# Patient Record
Sex: MALE | Race: WHITE | URBAN - METROPOLITAN AREA
[De-identification: names, ages, dates, MRNs, and addresses within clinical notes are randomized per-mention and may not be internally consistent; named-entity substitution may affect disease eponyms.]

---

## 2021-05-30 ENCOUNTER — RECORDS - HEALTHEAST (OUTPATIENT)
Dept: ADMINISTRATIVE | Facility: CLINIC | Age: 61
End: 2021-05-30

## 2025-04-02 ENCOUNTER — TELEPHONE (OUTPATIENT)
Dept: GERIATRICS | Facility: CLINIC | Age: 65
End: 2025-04-02

## 2025-04-02 ENCOUNTER — LAB REQUISITION (OUTPATIENT)
Dept: LAB | Facility: CLINIC | Age: 65
End: 2025-04-02
Payer: COMMERCIAL

## 2025-04-02 ENCOUNTER — TRANSITIONAL CARE UNIT VISIT (OUTPATIENT)
Dept: GERIATRICS | Facility: CLINIC | Age: 65
End: 2025-04-02
Payer: COMMERCIAL

## 2025-04-02 ENCOUNTER — DOCUMENTATION ONLY (OUTPATIENT)
Dept: GERIATRICS | Facility: CLINIC | Age: 65
End: 2025-04-02
Payer: COMMERCIAL

## 2025-04-02 VITALS
SYSTOLIC BLOOD PRESSURE: 130 MMHG | TEMPERATURE: 97.5 F | HEART RATE: 67 BPM | RESPIRATION RATE: 18 BRPM | DIASTOLIC BLOOD PRESSURE: 66 MMHG | BODY MASS INDEX: 36.82 KG/M2 | OXYGEN SATURATION: 96 % | WEIGHT: 271.5 LBS

## 2025-04-02 DIAGNOSIS — F41.9 ANXIETY: ICD-10-CM

## 2025-04-02 DIAGNOSIS — I47.10 SVT (SUPRAVENTRICULAR TACHYCARDIA): ICD-10-CM

## 2025-04-02 DIAGNOSIS — Z95.828 PRESENCE OF IMPLANTED INFUSION PUMP: ICD-10-CM

## 2025-04-02 DIAGNOSIS — E11.9 TYPE 2 DIABETES MELLITUS WITHOUT COMPLICATION, WITHOUT LONG-TERM CURRENT USE OF INSULIN (H): ICD-10-CM

## 2025-04-02 DIAGNOSIS — F33.9 RECURRENT MAJOR DEPRESSIVE DISORDER, REMISSION STATUS UNSPECIFIED: ICD-10-CM

## 2025-04-02 DIAGNOSIS — I10 PRIMARY HYPERTENSION: ICD-10-CM

## 2025-04-02 DIAGNOSIS — M54.50 CHRONIC LOW BACK PAIN, UNSPECIFIED BACK PAIN LATERALITY, UNSPECIFIED WHETHER SCIATICA PRESENT: ICD-10-CM

## 2025-04-02 DIAGNOSIS — G25.81 RESTLESS LEGS SYNDROME: ICD-10-CM

## 2025-04-02 DIAGNOSIS — I25.10 ATHEROSCLEROSIS OF NATIVE CORONARY ARTERY OF NATIVE HEART WITHOUT ANGINA PECTORIS: ICD-10-CM

## 2025-04-02 DIAGNOSIS — E66.812 OBESITY, CLASS II, BMI 35-39.9: Primary | ICD-10-CM

## 2025-04-02 DIAGNOSIS — G89.29 CHRONIC LOW BACK PAIN, UNSPECIFIED BACK PAIN LATERALITY, UNSPECIFIED WHETHER SCIATICA PRESENT: ICD-10-CM

## 2025-04-02 DIAGNOSIS — K59.03 DRUG-INDUCED CONSTIPATION: ICD-10-CM

## 2025-04-02 DIAGNOSIS — M79.7 FIBROMYALGIA: ICD-10-CM

## 2025-04-02 DIAGNOSIS — R33.9 URINARY RETENTION: ICD-10-CM

## 2025-04-02 DIAGNOSIS — F41.9 ANXIETY: Primary | ICD-10-CM

## 2025-04-02 DIAGNOSIS — M62.838 MUSCLE SPASM: ICD-10-CM

## 2025-04-02 PROBLEM — E87.1 HYPONATREMIA: Status: ACTIVE | Noted: 2017-05-25

## 2025-04-02 PROBLEM — R80.9 PROTEINURIA: Status: ACTIVE | Noted: 2017-05-25

## 2025-04-02 PROBLEM — L97.309 DIABETIC ULCER OF ANKLE (H): Status: ACTIVE | Noted: 2024-01-11

## 2025-04-02 PROBLEM — G56.22 CUBITAL TUNNEL SYNDROME ON LEFT: Status: ACTIVE | Noted: 2019-10-03

## 2025-04-02 PROBLEM — F33.1 MAJOR DEPRESSIVE DISORDER, RECURRENT, MODERATE (H): Status: ACTIVE | Noted: 2024-01-11

## 2025-04-02 PROBLEM — I95.9 HYPOTENSION: Status: ACTIVE | Noted: 2025-03-26

## 2025-04-02 PROBLEM — I50.810 RIGHT-SIDED HEART FAILURE, UNSPECIFIED HF CHRONICITY (H): Status: ACTIVE | Noted: 2025-03-06

## 2025-04-02 PROBLEM — E11.622 DIABETIC ULCER OF ANKLE (H): Status: ACTIVE | Noted: 2024-01-11

## 2025-04-02 PROBLEM — E87.5 HYPERKALEMIA: Status: ACTIVE | Noted: 2017-05-25

## 2025-04-02 PROBLEM — E11.8 CONTROLLED TYPE 2 DIABETES MELLITUS WITH COMPLICATION, WITHOUT LONG-TERM CURRENT USE OF INSULIN (H): Status: ACTIVE | Noted: 2022-10-13

## 2025-04-02 PROCEDURE — 99310 SBSQ NF CARE HIGH MDM 45: CPT

## 2025-04-02 RX ORDER — PROCHLORPERAZINE MALEATE 10 MG
10 TABLET ORAL EVERY 6 HOURS PRN
COMMUNITY

## 2025-04-02 RX ORDER — HYDROXYZINE PAMOATE 50 MG/1
50 CAPSULE ORAL EVERY 6 HOURS PRN
COMMUNITY

## 2025-04-02 RX ORDER — KETOCONAZOLE 20 MG/G
CREAM TOPICAL 2 TIMES DAILY
COMMUNITY

## 2025-04-02 RX ORDER — DIAZEPAM 5 MG/1
5 TABLET ORAL DAILY PRN
Qty: 15 TABLET | Refills: 0 | Status: SHIPPED | OUTPATIENT
Start: 2025-04-02

## 2025-04-02 RX ORDER — PANTOPRAZOLE SODIUM 40 MG/1
40 FOR SUSPENSION ORAL DAILY
COMMUNITY

## 2025-04-02 RX ORDER — GABAPENTIN 600 MG/1
600 TABLET ORAL 2 TIMES DAILY
COMMUNITY

## 2025-04-02 RX ORDER — ATORVASTATIN CALCIUM 80 MG/1
80 TABLET, FILM COATED ORAL DAILY
COMMUNITY

## 2025-04-02 RX ORDER — POLYETHYLENE GLYCOL 3350 17 G/17G
17 POWDER, FOR SOLUTION ORAL DAILY
COMMUNITY

## 2025-04-02 RX ORDER — BACLOFEN 10 MG/1
10 TABLET ORAL 3 TIMES DAILY PRN
COMMUNITY

## 2025-04-02 RX ORDER — METOPROLOL SUCCINATE 50 MG/1
50 TABLET, EXTENDED RELEASE ORAL DAILY
COMMUNITY

## 2025-04-02 RX ORDER — TIZANIDINE HYDROCHLORIDE 2 MG/1
2 CAPSULE, GELATIN COATED ORAL EVERY 12 HOURS PRN
COMMUNITY

## 2025-04-02 RX ORDER — BACLOFEN 10 MG/1
10 TABLET ORAL 2 TIMES DAILY
COMMUNITY

## 2025-04-02 RX ORDER — GLIPIZIDE 10 MG/1
10 TABLET ORAL DAILY
COMMUNITY

## 2025-04-02 RX ORDER — ACETAMINOPHEN 325 MG/1
650 TABLET ORAL EVERY 4 HOURS PRN
COMMUNITY

## 2025-04-02 RX ORDER — BISACODYL 10 MG
10 SUPPOSITORY, RECTAL RECTAL DAILY PRN
COMMUNITY

## 2025-04-02 RX ORDER — DICYCLOMINE HCL 20 MG
20 TABLET ORAL 3 TIMES DAILY PRN
COMMUNITY

## 2025-04-02 RX ORDER — TAMSULOSIN HYDROCHLORIDE 0.4 MG/1
0.4 CAPSULE ORAL DAILY
COMMUNITY

## 2025-04-02 RX ORDER — ASPIRIN 81 MG/1
81 TABLET, CHEWABLE ORAL DAILY
COMMUNITY

## 2025-04-02 RX ORDER — FOLIC ACID 0.4 MG
400 TABLET ORAL DAILY
COMMUNITY

## 2025-04-02 RX ORDER — OXYCODONE HYDROCHLORIDE 5 MG/1
10 TABLET ORAL EVERY 4 HOURS PRN
COMMUNITY

## 2025-04-02 RX ORDER — BACLOFEN 20 MG/1
20 TABLET ORAL DAILY
COMMUNITY

## 2025-04-02 RX ORDER — AMOXICILLIN 250 MG
1 CAPSULE ORAL DAILY
COMMUNITY

## 2025-04-02 RX ORDER — LISINOPRIL 40 MG/1
40 TABLET ORAL DAILY
COMMUNITY

## 2025-04-02 NOTE — PROGRESS NOTES
[facility] GERIATRICS    PRIMARY CARE PROVIDER AND CLINIC:  Jose J Angeles MD, [address] / [place] [address]  Chief Complaint   Patient presents with    Hospital F/U      [place] Medical Record Number:  [id number]  Place of Service where encounter took place:  [facility] ([facility]) [[id number]]    Royer Landon  is a 65 year old  (1960), admitted to the above facility from  [facility]. Hospital stay 3/26 through 4/1..     HPI:  Brief Hospital Course Summary:  Presented to the ED with two day sof worsening muscle spasms. Has a history of baclofen/morphine pump catheter occulusion requriing replacement.   On arrival to the ED patient was hypertensive (180/116 mmHg) and tachycardic (126). While in the ER he became hypotensive and no longer fluid responsive.  He met septic shock criteria (BP 86/45 mmHg, Creatinine 2.23, WBC 19.7, lactate 3.1), started on Levophed, given IV antibiotics and admitted for further management.    The patient was admitted to the ICU, monitored on telemetry.  BP dropped after receiving IV Valium 5 mg.  Hypotension resolved.  Infectious work up negative.  ID consulted. WBC count improved from 19.7 to 8.6.  Blood and Urine Cultures negative. COVID-19/RSV/Flu negative.  IV Zosyn stopped (3/26- 3/29).   Septic Shock ruled out; rather hypotensive episode is from pain med.   Pain clinic and PMR consulted.  Pain and muscle spasms not well controlled with current baclofen/Morphine intrathecal pump.  Concern for pump malfunction vs kink.  XR Baclofen Pump and Catheter check custom negative for kinks.  XR Lumbar and Thoracic Spine completed.  Appears Cathter tip is at T6.  Per [employer] Pump Interrogation- intrathecal cathter tip should terminate at T8.   Not sure if T6 is previous cathter tip or current pump/cathter tip migration.   Patient is concerned his cathter tip is occluded.  He states he has had dual Baclofen/Morphine pump for decades.  Most  recently replaced by Dr. Augustine on 7/13/2021.  He gets his pump/cathter exchanged every 5 years.  Initial Baclofen/Morphine pump placed by Dr. Ranjith Wharton in ~ 2007?  Etiology of muscle spasms likely from uncontrolled pain.  [facility]UNAL was consulted to evaluate for seizures.  Patient had a video EEG that was negative for seizures.  [place] [facility] signed off.  Neuro-hospitalist consulted 3/28 and signed off 3/29.  Unlikely patient has MS or some other neurological disorder contributing to worsening muscle spasms.  Neurology recommends he gets his Baclofen/Morphine pump evaluated.  Psychiatry was also consulted to weigh in on patient's worsening muscle spasms and if malingering or uncontrolled anxiety is playing a role.  Dr. Mosquera evaluate patient and felt patient's acute on on chronic pain, muscle spasms are causing worsening anxiety and recommend continuing with Buproprion Xl 300 mg AM and Duloxetine 60 mg AM unchanged.   [facility] and PMR evaluated patient.  Patient does not have signs of opioid withdrawal.  Considering patient's intrathecal pump delivers both Morphine and Baclofen, patient should have signs of opioid withdrawal if his pump was malfunctioning.    [employer] interrogated pump.  Both Medications (Morphine and Baclofen) are being delivered appropriately.  Unsure if there is partial occlusion in cathter tip.  [facility] does not have Privileges at [facility]; but do have privileges at [facility].  Discussed case with Dr. Augustine ([facility] on 3/28, Gege NP on 3/27,  Jesus GONZALEZ from [facility] on 3/27).  Per [facility] records there are no recent changes in medication dose.  Recent February 2025 drain study shows pump in appropriate location.    There was also concern of MS in [facility] note, but patient denies being diagnosed with MS.  Neurology- Dr. Aguirre does not feel patient has MS.   His home oral baclofen was increased to Baclofen 10 mg BID/20mg hs, and 10 mg three times daily PRN. This was  helpful. Robaxin prn added for muscle spasm as well.  Oxycodone prn helpful for pain control. Will allow for daily prn valium for anxiety/muscle spasm though should minimize use.  While on telemetry, patient's heart rate elevated up to 150s- 180s  Cardiology consult requested.  Monitor on telemetry. Cardiology notes that pt had an episode of SVT in the setting of holding his verapamil. Discussion was held about restarting verapamil but pt/wife note that verapamil is very expensive so decision made to switch to metoprolol. Now is on metoprolol 25mg bid. Plan for Zio patch for 2 weeks at discharge. Follow up with cardiology in 4 weeks to review Zio patch.  Urology consulted for urinary retention, placed a mac.  Suspect patient has BPH and some element of neurogenic bladder given multiple spine surgeries.  Initial spine surgery was in 1982. Mac catheter placed 3/28; keep fo 5-7 days and then consider TOV.  Pt much weaker compared to prior to admission. TCU recommended and found. He will complete his therapies at TCU and then will f/u with [facility] for intrathecal catheter dye study. (Had attempt to schedule it for during his TCU stay but cost is too high so will have to have it done after TCU stay).     Today Jasmeet reports that he is really tired. Feels like he has not had a good night of sleep for several days due to being in the hospital. States that at baseline he walks with a walker or uses his wheelchair to get around. He had two episodes of muscle spasms last night. Along with these, he felt his heart  racing. Note, he is wearing a holter monitor. States he is constipated. At home does not have bowel movements frequently, it is normal for him to go ~ 2 weeks without a bowel movement. Mac catheter is in place. Eating and drinking. Denies nausea/vomiting. Denies shortness of breath, chest pain, palpitations, dizziness, lightheadedness.  Confirmed today, pt is full code.     CODE STATUS/ADVANCE DIRECTIVES  DISCUSSION:  Full Code  CPR/Full code   ALLERGIES:   Allergies   Allergen Reactions    Adhesive Tape       PAST MEDICAL HISTORY:   Past Medical History:   Diagnosis Date    Back pain     Diabetes (H)     Hypertension     Sleep apnea       PAST SURGICAL HISTORY:   has a past surgical history that includes appendectomy; ENT surgery; and back surgery.  FAMILY HISTORY: family history is not on file.  SOCIAL HISTORY:   reports that he has quit smoking. He does not have any smokeless tobacco history on file.  Patient's living condition: lives with spouse    Current Outpatient Medications   Medication Sig Dispense Refill    acetaminophen (TYLENOL) 325 MG tablet Take 650 mg by mouth every 4 hours as needed for mild pain.      aspirin (ASA) 81 MG chewable tablet Take 81 mg by mouth daily.      atorvastatin (LIPITOR) 80 MG tablet Take 80 mg by mouth daily.      baclofen (LIORESAL) 10 MG tablet Take 10 mg by mouth 3 times daily as needed for muscle spasms.      baclofen (LIORESAL) 10 MG tablet Take 10 mg by mouth 2 times daily.      baclofen (LIORESAL) 20 MG tablet Take 20 mg by mouth daily.      bisacodyl (DULCOLAX) 10 MG suppository Place 10 mg rectally daily as needed for constipation.      BUPROPION HCL ER, XL, PO Take 300 mg by mouth daily      diazepam (VALIUM) 5 MG tablet Take 1 tablet (5 mg) by mouth daily as needed for anxiety. 15 tablet 0    dicyclomine (BENTYL) 20 MG tablet Take 20 mg by mouth 3 times daily as needed.      DULoxetine HCl (CYMBALTA PO) Take 60 mg by mouth daily 2 tabs in AM      folic acid (FOLVITE) 400 MCG tablet Take 400 mcg by mouth daily.      gabapentin (NEURONTIN) 600 MG tablet Take 600 mg by mouth 2 times daily.      glipiZIDE (GLUCOTROL) 10 MG tablet Take 10 mg by mouth daily.      hydrOXYzine (VISTARIL) 50 MG capsule Take 50 mg by mouth every 6 hours as needed for itching.      ketoconazole (NIZORAL) 2 % external cream Apply topically 2 times daily.      lisinopril (ZESTRIL) 40 MG tablet  Take 40 mg by mouth daily.      melatonin 3 MG tablet Take 3 mg by mouth nightly as needed for sleep.      metFORMIN (GLUCOPHAGE) 1000 MG tablet Take 1,000 mg by mouth 2 times daily (with meals).      metoprolol succinate ER (TOPROL XL) 50 MG 24 hr tablet Take 50 mg by mouth daily.      naloxone (NARCAN) 1 mg/mL SUSP Take 1 mg by mouth every 5 minutes as needed for constipation.      oxyCODONE (ROXICODONE) 5 MG tablet Take 10 mg by mouth every 4 hours as needed for severe pain.      pantoprazole sodium (PROTONIX) 40 MG packet Take 40 mg by mouth daily.      polyethylene glycol (MIRALAX) 17 g packet Take 17 g by mouth daily.      Probiotic Product (EULOGIO-BID PROBIOTIC PO) Take 250 mg by mouth daily.      prochlorperazine (COMPAZINE) 10 MG tablet Take 10 mg by mouth every 6 hours as needed for nausea or vomiting.      senna-docusate (SENOKOT-S/PERICOLACE) 8.6-50 MG tablet Take 1 tablet by mouth daily.      tamsulosin (FLOMAX) 0.4 MG capsule Take 0.4 mg by mouth daily.      tiZANidine (ZANAFLEX) 2 MG capsule Take 2 mg by mouth every 12 hours as needed.       No current facility-administered medications for this visit.        ROS:  4 point ROS including Respiratory, CV, GI and , other than that noted in the HPI,  is negative    Vital signs:/66   Pulse 67   Temp 97.5  F (36.4  C)   Resp 18   Wt 123.2 kg (271 lb 8 oz)   SpO2 96%   BMI 36.82 kg/m     GENERAL APPEARANCE: Well developed, well nourished, in no acute distress.  LUNGS: Lung sounds CTA, no adventitious sounds, respiratory effort normal.  CARD: RRR, S1, S2, without murmurs, gallops, rubs, no JVD, peripheral pulses 2+ and symmetric  ABD: Soft, nondistended and nontender with normal bowel sounds.   MSK: Muscle strength and tone were equal bilaterally. Moves all extremities easily and intentionally.   EXTREMITIES: No cyanosis, clubbing or edema.  NEURO: Alert and oriented x 3. Lethargic  PSYCH: pleasant     Lab/Diagnostic data:  Recent labs in [facility]  reviewed by me today.  This includes magnesium, hgb, BMP, CBC    ASSESSMENT/PLAN:  Intrathecal pain pump  Muscle spasms  Chronic low back pain  Anxiety  Fibromyalgia syndrome  Depression  Obesity  BMI 35-39.9  Initially was hypotensive upon admission to ED, thought to be related to pain meds  Negative EEG, xr catheter study negative  [facility] to follow up with patient upon TCU discharge for evaluation and dye study  Spasms currently managed with PRN tylenol, PRN baclofen, scheduled baclofen, PRN oxycodone and PRN tizanidine  Anxiety thought to be contributing to spasms- continue diazepam PRN, duloxetine, PRN hydroxyzine and bupropion  PT/OT  Weight today 271.5 lbs     Constipation  Discussed importance of at the minimum, EOD Bms  Senna s 2 tabs BID  Miralax daily  PRN bisocody suppository if no BM in next 24 hours    SVT  HTN  Coronary artery atherosclerosis  Metoprolol start in the hosptial (PTA verapamil Dc'd)  Hrs 60s  Bps 130s/60s  Continue PTA lisinopril and asa 81   Continue atorvastatin  Currently completing zio patch- follow up with cardiology up discharge from TCU    Type II DM  Most recent A1c 8.3%  Continue PTA glipizide  BG checks once daily at 7:30 am    Urinary retention  De La Torre catheter is in from hospitalization  PVR trial on 4/4/25  Continue tamsulosin  Follow up with outpt urology    Orders:  Magnesium, CBC, BMP due 4/7/25    >45 minutes spent assessing patient, providing education, reviewing records from recent hospitalization at [facility], collaborating with nursing, developing plan of care.     Electronically signed by:  JONAS Walls CNP

## 2025-04-02 NOTE — LETTER
4/2/2025      Royer Landon  [address]  [place] [address]        [facility] GERIATRICS    PRIMARY CARE PROVIDER AND CLINIC:  Jose J Angeles MD, [address] / [place] [address]  Chief Complaint   Patient presents with     Hospital F/U      [place] Medical Record Number:  [id number]  Place of Service where encounter took place:  [facility] ([facility]) [[id number]]    Royer Landon  is a 65 year old  (1960), admitted to the above facility from  [facility]. Hospital stay 3/26 through 4/1..     HPI:  Brief Hospital Course Summary:  Presented to the ED with two day sof worsening muscle spasms. Has a history of baclofen/morphine pump catheter occulusion requriing replacement.   On arrival to the ED patient was hypertensive (180/116 mmHg) and tachycardic (126). While in the ER he became hypotensive and no longer fluid responsive.  He met septic shock criteria (BP 86/45 mmHg, Creatinine 2.23, WBC 19.7, lactate 3.1), started on Levophed, given IV antibiotics and admitted for further management.    The patient was admitted to the ICU, monitored on telemetry.  BP dropped after receiving IV Valium 5 mg.  Hypotension resolved.  Infectious work up negative.  ID consulted. WBC count improved from 19.7 to 8.6.  Blood and Urine Cultures negative. COVID-19/RSV/Flu negative.  IV Zosyn stopped (3/26- 3/29).   Septic Shock ruled out; rather hypotensive episode is from pain med.   Pain clinic and PMR consulted.  Pain and muscle spasms not well controlled with current baclofen/Morphine intrathecal pump.  Concern for pump malfunction vs kink.  XR Baclofen Pump and Catheter check custom negative for kinks.  XR Lumbar and Thoracic Spine completed.  Appears Cathter tip is at T6.  Per [employer] Pump Interrogation- intrathecal cathter tip should terminate at T8.   Not sure if T6 is previous cathter tip or current pump/cathter tip migration.   Patient is concerned his cathter  tip is occluded.  He states he has had dual Baclofen/Morphine pump for decades.  Most recently replaced by Dr. Augustine on 7/13/2021.  He gets his pump/cathter exchanged every 5 years.  Initial Baclofen/Morphine pump placed by Dr. aRnjith Wharton in ~ 2007?  Etiology of muscle spasms likely from uncontrolled pain.  [facility] was consulted to evaluate for seizures.  Patient had a video EEG that was negative for seizures.  [place] [facility] signed off.  Neuro-hospitalist consulted 3/28 and signed off 3/29.  Unlikely patient has MS or some other neurological disorder contributing to worsening muscle spasms.  Neurology recommends he gets his Baclofen/Morphine pump evaluated.  Psychiatry was also consulted to weigh in on patient's worsening muscle spasms and if malingering or uncontrolled anxiety is playing a role.  Dr. Mosquera evaluate patient and felt patient's acute on on chronic pain, muscle spasms are causing worsening anxiety and recommend continuing with Buproprion Xl 300 mg AM and Duloxetine 60 mg AM unchanged.   [facility] and PMR evaluated patient.  Patient does not have signs of opioid withdrawal.  Considering patient's intrathecal pump delivers both Morphine and Baclofen, patient should have signs of opioid withdrawal if his pump was malfunctioning.    [employer] interrogated pump.  Both Medications (Morphine and Baclofen) are being delivered appropriately.  Unsure if there is partial occlusion in cathter tip.  [facility] does not have Privileges at [facility]; but do have privileges at [facility].  Discussed case with Dr. Augustine ([facility] on 3/28, Gege NP on 3/27,  Jesus GONZALEZ from [facility] on 3/27).  Per [facility] records there are no recent changes in medication dose.  Recent February 2025 drain study shows pump in appropriate location.    There was also concern of MS in [facility] note, but patient denies being diagnosed with MS.  Neurology- Dr. Aguirre does not feel patient has MS.   His home oral baclofen was  increased to Baclofen 10 mg BID/20mg hs, and 10 mg three times daily PRN. This was helpful. Robaxin prn added for muscle spasm as well.  Oxycodone prn helpful for pain control. Will allow for daily prn valium for anxiety/muscle spasm though should minimize use.  While on telemetry, patient's heart rate elevated up to 150s- 180s  Cardiology consult requested.  Monitor on telemetry. Cardiology notes that pt had an episode of SVT in the setting of holding his verapamil. Discussion was held about restarting verapamil but pt/wife note that verapamil is very expensive so decision made to switch to metoprolol. Now is on metoprolol 25mg bid. Plan for Zio patch for 2 weeks at discharge. Follow up with cardiology in 4 weeks to review Zio patch.  Urology consulted for urinary retention, placed a mac.  Suspect patient has BPH and some element of neurogenic bladder given multiple spine surgeries.  Initial spine surgery was in 1982. Mac catheter placed 3/28; keep fo 5-7 days and then consider TOV.  Pt much weaker compared to prior to admission. TCU recommended and found. He will complete his therapies at TCU and then will f/u with [facility] for intrathecal catheter dye study. (Had attempt to schedule it for during his TCU stay but cost is too high so will have to have it done after TCU stay).     Today Jasmeet reports that he is really tired. Feels like he has not had a good night of sleep for several days due to being in the hospital. States that at baseline he walks with a walker or uses his wheelchair to get around. He had two episodes of muscle spasms last night. Along with these, he felt his heart  racing. Note, he is wearing a holter monitor. States he is constipated. At home does not have bowel movements frequently, it is normal for him to go ~ 2 weeks without a bowel movement. Mac catheter is in place. Eating and drinking. Denies nausea/vomiting. Denies shortness of breath, chest pain, palpitations, dizziness,  lightheadedness.  Confirmed today, pt is full code.     CODE STATUS/ADVANCE DIRECTIVES DISCUSSION:  Full Code  CPR/Full code   ALLERGIES:   Allergies   Allergen Reactions     Adhesive Tape       PAST MEDICAL HISTORY:   Past Medical History:   Diagnosis Date     Back pain      Diabetes (H)      Hypertension      Sleep apnea       PAST SURGICAL HISTORY:   has a past surgical history that includes appendectomy; ENT surgery; and back surgery.  FAMILY HISTORY: family history is not on file.  SOCIAL HISTORY:   reports that he has quit smoking. He does not have any smokeless tobacco history on file.  Patient's living condition: lives with spouse    Current Outpatient Medications   Medication Sig Dispense Refill     acetaminophen (TYLENOL) 325 MG tablet Take 650 mg by mouth every 4 hours as needed for mild pain.       aspirin (ASA) 81 MG chewable tablet Take 81 mg by mouth daily.       atorvastatin (LIPITOR) 80 MG tablet Take 80 mg by mouth daily.       baclofen (LIORESAL) 10 MG tablet Take 10 mg by mouth 3 times daily as needed for muscle spasms.       baclofen (LIORESAL) 10 MG tablet Take 10 mg by mouth 2 times daily.       baclofen (LIORESAL) 20 MG tablet Take 20 mg by mouth daily.       bisacodyl (DULCOLAX) 10 MG suppository Place 10 mg rectally daily as needed for constipation.       BUPROPION HCL ER, XL, PO Take 300 mg by mouth daily       diazepam (VALIUM) 5 MG tablet Take 1 tablet (5 mg) by mouth daily as needed for anxiety. 15 tablet 0     dicyclomine (BENTYL) 20 MG tablet Take 20 mg by mouth 3 times daily as needed.       DULoxetine HCl (CYMBALTA PO) Take 60 mg by mouth daily 2 tabs in AM       folic acid (FOLVITE) 400 MCG tablet Take 400 mcg by mouth daily.       gabapentin (NEURONTIN) 600 MG tablet Take 600 mg by mouth 2 times daily.       glipiZIDE (GLUCOTROL) 10 MG tablet Take 10 mg by mouth daily.       hydrOXYzine (VISTARIL) 50 MG capsule Take 50 mg by mouth every 6 hours as needed for itching.        ketoconazole (NIZORAL) 2 % external cream Apply topically 2 times daily.       lisinopril (ZESTRIL) 40 MG tablet Take 40 mg by mouth daily.       melatonin 3 MG tablet Take 3 mg by mouth nightly as needed for sleep.       metFORMIN (GLUCOPHAGE) 1000 MG tablet Take 1,000 mg by mouth 2 times daily (with meals).       metoprolol succinate ER (TOPROL XL) 50 MG 24 hr tablet Take 50 mg by mouth daily.       naloxone (NARCAN) 1 mg/mL SUSP Take 1 mg by mouth every 5 minutes as needed for constipation.       oxyCODONE (ROXICODONE) 5 MG tablet Take 10 mg by mouth every 4 hours as needed for severe pain.       pantoprazole sodium (PROTONIX) 40 MG packet Take 40 mg by mouth daily.       polyethylene glycol (MIRALAX) 17 g packet Take 17 g by mouth daily.       Probiotic Product (EULOGIO-BID PROBIOTIC PO) Take 250 mg by mouth daily.       prochlorperazine (COMPAZINE) 10 MG tablet Take 10 mg by mouth every 6 hours as needed for nausea or vomiting.       senna-docusate (SENOKOT-S/PERICOLACE) 8.6-50 MG tablet Take 1 tablet by mouth daily.       tamsulosin (FLOMAX) 0.4 MG capsule Take 0.4 mg by mouth daily.       tiZANidine (ZANAFLEX) 2 MG capsule Take 2 mg by mouth every 12 hours as needed.       No current facility-administered medications for this visit.        ROS:  4 point ROS including Respiratory, CV, GI and , other than that noted in the HPI,  is negative    Vital signs:/66   Pulse 67   Temp 97.5  F (36.4  C)   Resp 18   Wt 123.2 kg (271 lb 8 oz)   SpO2 96%   BMI 36.82 kg/m     GENERAL APPEARANCE: Well developed, well nourished, in no acute distress.  LUNGS: Lung sounds CTA, no adventitious sounds, respiratory effort normal.  CARD: RRR, S1, S2, without murmurs, gallops, rubs, no JVD, peripheral pulses 2+ and symmetric  ABD: Soft, nondistended and nontender with normal bowel sounds.   MSK: Muscle strength and tone were equal bilaterally. Moves all extremities easily and intentionally.   EXTREMITIES: No cyanosis,  clubbing or edema.  NEURO: Alert and oriented x 3. Lethargic  PSYCH: pleasant     Lab/Diagnostic data:  Recent labs in [facility] reviewed by me today.  This includes magnesium, hgb, BMP, CBC    ASSESSMENT/PLAN:  Intrathecal pain pump  Muscle spasms  Chronic low back pain  Anxiety  Fibromyalgia syndrome  Depression  Obesity  BMI 35-39.9  Initially was hypotensive upon admission to ED, thought to be related to pain meds  Negative EEG, xr catheter study negative  [facility] to follow up with patient upon TCU discharge for evaluation and dye study  Spasms currently managed with PRN tylenol, PRN baclofen, scheduled baclofen, PRN oxycodone and PRN tizanidine  Anxiety thought to be contributing to spasms- continue diazepam PRN, duloxetine, PRN hydroxyzine and bupropion  PT/OT  Weight today 271.5 lbs     Constipation  Discussed importance of at the minimum, EOD Bms  Senna s 2 tabs BID  Miralax daily  PRN bisocody suppository if no BM in next 24 hours    SVT  HTN  Coronary artery atherosclerosis  Metoprolol start in the hosptial (PTA verapamil Dc'd)  Hrs 60s  Bps 130s/60s  Continue PTA lisinopril and asa 81   Continue atorvastatin  Currently completing zio patch- follow up with cardiology up discharge from TCU    Type II DM  Most recent A1c 8.3%  Continue PTA glipizide  BG checks once daily at 7:30 am    Urinary retention  De La Torre catheter is in from hospitalization  PVR trial on 4/4/25  Continue tamsulosin  Follow up with outpt urology    Orders:  Magnesium, CBC, BMP due 4/7/25    >45 minutes spent assessing patient, providing education, reviewing records from recent hospitalization at [facility], collaborating with nursing, developing plan of care.     Electronically signed by:  JONAS Walls CNP                    Sincerely,        JONAS Walls CNP    Electronically signed

## 2025-04-03 ENCOUNTER — TELEPHONE (OUTPATIENT)
Dept: GERIATRICS | Facility: CLINIC | Age: 65
End: 2025-04-03
Payer: COMMERCIAL

## 2025-04-03 NOTE — TELEPHONE ENCOUNTER
[facility]   2025     Name: Royer Landon   : 1960     Background:  Patient complains of extreme pain from his catheter with urination. Requests to go to ED.    Orders:  Ok to send to ED per patient request.    Electronically signed by JONAS Blas CNP

## 2025-04-04 NOTE — TELEPHONE ENCOUNTER
Facilty called at 8:55 PM.  Pt having pain in his bladder/catheter/penis and wants to go to the ED.  He was in the ED on 4/2/25.  Wife doesn't want to send him to the ED because they didn't do anything on 4/2/25.     Has prn medications. Will try some pain medications and baclofen prn.  To help stop pain        Orders  1) Urojet 2% lidocaine gel  2) if not better change mac and obtain a UA/UC.       JONAS Shin CNP on 4/3/2025 at 9:07 PM

## 2025-04-07 ENCOUNTER — DOCUMENTATION ONLY (OUTPATIENT)
Dept: GERIATRICS | Facility: CLINIC | Age: 65
End: 2025-04-07

## 2025-04-07 ENCOUNTER — TRANSITIONAL CARE UNIT VISIT (OUTPATIENT)
Dept: GERIATRICS | Facility: CLINIC | Age: 65
End: 2025-04-07
Payer: COMMERCIAL

## 2025-04-07 VITALS
OXYGEN SATURATION: 95 % | TEMPERATURE: 97 F | BODY MASS INDEX: 38.01 KG/M2 | DIASTOLIC BLOOD PRESSURE: 77 MMHG | HEIGHT: 71 IN | RESPIRATION RATE: 16 BRPM | SYSTOLIC BLOOD PRESSURE: 134 MMHG | HEART RATE: 59 BPM | WEIGHT: 271.5 LBS

## 2025-04-07 DIAGNOSIS — M79.7 FIBROMYALGIA: ICD-10-CM

## 2025-04-07 DIAGNOSIS — I47.10 SVT (SUPRAVENTRICULAR TACHYCARDIA): ICD-10-CM

## 2025-04-07 DIAGNOSIS — M62.838 MUSCLE SPASM: ICD-10-CM

## 2025-04-07 DIAGNOSIS — I10 PRIMARY HYPERTENSION: ICD-10-CM

## 2025-04-07 DIAGNOSIS — F41.9 ANXIETY: ICD-10-CM

## 2025-04-07 DIAGNOSIS — Z95.828 PRESENCE OF IMPLANTED INFUSION PUMP: Primary | ICD-10-CM

## 2025-04-07 DIAGNOSIS — E11.9 TYPE 2 DIABETES MELLITUS WITHOUT COMPLICATION, WITHOUT LONG-TERM CURRENT USE OF INSULIN (H): ICD-10-CM

## 2025-04-07 DIAGNOSIS — M54.50 CHRONIC LOW BACK PAIN, UNSPECIFIED BACK PAIN LATERALITY, UNSPECIFIED WHETHER SCIATICA PRESENT: ICD-10-CM

## 2025-04-07 DIAGNOSIS — F33.9 RECURRENT MAJOR DEPRESSIVE DISORDER, REMISSION STATUS UNSPECIFIED: ICD-10-CM

## 2025-04-07 DIAGNOSIS — K59.03 DRUG-INDUCED CONSTIPATION: ICD-10-CM

## 2025-04-07 DIAGNOSIS — G89.29 CHRONIC LOW BACK PAIN, UNSPECIFIED BACK PAIN LATERALITY, UNSPECIFIED WHETHER SCIATICA PRESENT: ICD-10-CM

## 2025-04-07 DIAGNOSIS — E66.812 OBESITY, CLASS II, BMI 35-39.9: ICD-10-CM

## 2025-04-07 DIAGNOSIS — I25.10 ATHEROSCLEROSIS OF NATIVE CORONARY ARTERY OF NATIVE HEART WITHOUT ANGINA PECTORIS: ICD-10-CM

## 2025-04-07 LAB
ANION GAP SERPL CALCULATED.3IONS-SCNC: 13 MMOL/L (ref 7–15)
BUN SERPL-MCNC: 21.7 MG/DL (ref 8–23)
CALCIUM SERPL-MCNC: 9 MG/DL (ref 8.8–10.4)
CHLORIDE SERPL-SCNC: 99 MMOL/L (ref 98–107)
CREAT SERPL-MCNC: 1.27 MG/DL (ref 0.67–1.17)
EGFRCR SERPLBLD CKD-EPI 2021: 63 ML/MIN/1.73M2
ERYTHROCYTE [DISTWIDTH] IN BLOOD BY AUTOMATED COUNT: 14.9 % (ref 10–15)
GLUCOSE SERPL-MCNC: 184 MG/DL (ref 70–99)
HCO3 SERPL-SCNC: 24 MMOL/L (ref 22–29)
HCT VFR BLD AUTO: 38.1 % (ref 40–53)
HGB BLD-MCNC: 12 G/DL (ref 13.3–17.7)
MAGNESIUM SERPL-MCNC: 2.1 MG/DL (ref 1.7–2.3)
MCH RBC QN AUTO: 26.8 PG (ref 26.5–33)
MCHC RBC AUTO-ENTMCNC: 31.5 G/DL (ref 31.5–36.5)
MCV RBC AUTO: 85 FL (ref 78–100)
PLATELET # BLD AUTO: 283 10E3/UL (ref 150–450)
POTASSIUM SERPL-SCNC: 4.5 MMOL/L (ref 3.4–5.3)
RBC # BLD AUTO: 4.47 10E6/UL (ref 4.4–5.9)
SODIUM SERPL-SCNC: 136 MMOL/L (ref 135–145)
WBC # BLD AUTO: 8.9 10E3/UL (ref 4–11)

## 2025-04-07 PROCEDURE — 80048 BASIC METABOLIC PNL TOTAL CA: CPT | Mod: ORL

## 2025-04-07 PROCEDURE — 85027 COMPLETE CBC AUTOMATED: CPT | Mod: ORL

## 2025-04-07 PROCEDURE — P9604 ONE-WAY ALLOW PRORATED TRIP: HCPCS | Mod: ORL

## 2025-04-07 PROCEDURE — 99309 SBSQ NF CARE MODERATE MDM 30: CPT

## 2025-04-07 PROCEDURE — 36415 COLL VENOUS BLD VENIPUNCTURE: CPT | Mod: ORL

## 2025-04-07 PROCEDURE — 83735 ASSAY OF MAGNESIUM: CPT | Mod: ORL

## 2025-04-07 RX ORDER — OXYCODONE HYDROCHLORIDE 5 MG/1
5 TABLET ORAL
Qty: 60 TABLET | Refills: 0 | Status: SHIPPED | OUTPATIENT
Start: 2025-04-07

## 2025-04-07 RX ORDER — OXYCODONE HYDROCHLORIDE 5 MG/1
5 TABLET ORAL 2 TIMES DAILY PRN
COMMUNITY
Start: 2025-04-07 | End: 2025-04-07

## 2025-04-07 NOTE — PROGRESS NOTES
[facility] GERIATRICS    PRIMARY CARE PROVIDER AND CLINIC:  Jose J Angeles MD, [address] [facility] [address] / [place] [address]  Chief Complaint   Patient presents with    [facility] Medical Record Number:  [id number]  Place of Service where encounter took place:  [facility] ([facility]) [[id number]]    Royer Landon  is a 65 year old  (1960), admitted to the above facility from  [facility]. Hospital stay 3/26 through 4/1..     HPI:  Brief Hospital Course Summary:  Presented to the ED with two day sof worsening muscle spasms. Has a history of baclofen/morphine pump catheter occulusion requriing replacement.   On arrival to the ED patient was hypertensive (180/116 mmHg) and tachycardic (126). While in the ER he became hypotensive and no longer fluid responsive.  He met septic shock criteria (BP 86/45 mmHg, Creatinine 2.23, WBC 19.7, lactate 3.1), started on Levophed, given IV antibiotics and admitted for further management.    The patient was admitted to the ICU, monitored on telemetry.  BP dropped after receiving IV Valium 5 mg.  Hypotension resolved.  Infectious work up negative.  ID consulted. WBC count improved from 19.7 to 8.6.  Blood and Urine Cultures negative. COVID-19/RSV/Flu negative.  IV Zosyn stopped (3/26- 3/29).   Septic Shock ruled out; rather hypotensive episode is from pain med.   Pain clinic and PMR consulted.  Pain and muscle spasms not well controlled with current baclofen/Morphine intrathecal pump.  Concern for pump malfunction vs kink.  XR Baclofen Pump and Catheter check custom negative for kinks.  XR Lumbar and Thoracic Spine completed.  Appears Cathter tip is at T6.  Per [employer] Pump Interrogation- intrathecal cathter tip should terminate at T8.   Not sure if T6 is previous cathter tip or current pump/cathter tip migration.   Patient is concerned his cathter tip is occluded.  He states he has had dual Baclofen/Morphine pump for decades.  Most  recently replaced by Dr. Augustine on 7/13/2021.  He gets his pump/cathter exchanged every 5 years.  Initial Baclofen/Morphine pump placed by Dr. Ranjith Wharton in ~ 2007?  Etiology of muscle spasms likely from uncontrolled pain.  KUNAL was consulted to evaluate for seizures.  Patient had a video EEG that was negative for seizures.  [place] [facility] signed off.  Neuro-hospitalist consulted 3/28 and signed off 3/29.  Unlikely patient has MS or some other neurological disorder contributing to worsening muscle spasms.  Neurology recommends he gets his Baclofen/Morphine pump evaluated.  Psychiatry was also consulted to weigh in on patient's worsening muscle spasms and if malingering or uncontrolled anxiety is playing a role.  Dr. Mosquera evaluate patient and felt patient's acute on on chronic pain, muscle spasms are causing worsening anxiety and recommend continuing with Buproprion Xl 300 mg AM and Duloxetine 60 mg AM unchanged.   [facility] and PMR evaluated patient.  Patient does not have signs of opioid withdrawal.  Considering patient's intrathecal pump delivers both Morphine and Baclofen, patient should have signs of opioid withdrawal if his pump was malfunctioning.    [employer] interrogated pump.  Both Medications (Morphine and Baclofen) are being delivered appropriately.  Unsure if there is partial occlusion in cathter tip.  [facility] does not have Privileges at [facility]; but do have privileges at [facility].  Discussed case with Dr. Augustine ([facility] on 3/28, Gege NP on 3/27,  Jesus GONZALEZ from [facility] on 3/27).  Per [facility] records there are no recent changes in medication dose.  Recent February 2025 drain study shows pump in appropriate location.    There was also concern of MS in [facility] note, but patient denies being diagnosed with MS.  Neurology- Dr. Aguirre does not feel patient has MS.   His home oral baclofen was increased to Baclofen 10 mg BID/20mg hs, and 10 mg three times daily PRN. This was  helpful. Robaxin prn added for muscle spasm as well.  Oxycodone prn helpful for pain control. Will allow for daily prn valium for anxiety/muscle spasm though should minimize use.  While on telemetry, patient's heart rate elevated up to 150s- 180s  Cardiology consult requested.  Monitor on telemetry. Cardiology notes that pt had an episode of SVT in the setting of holding his verapamil. Discussion was held about restarting verapamil but pt/wife note that verapamil is very expensive so decision made to switch to metoprolol. Now is on metoprolol 25mg bid. Plan for Zio patch for 2 weeks at discharge. Follow up with cardiology in 4 weeks to review Zio patch.  Urology consulted for urinary retention, placed a mac.  Suspect patient has BPH and some element of neurogenic bladder given multiple spine surgeries.  Initial spine surgery was in 1982. Mac catheter placed 3/28; keep fo 5-7 days and then consider TOV.  Pt much weaker compared to prior to admission. TCU recommended and found. He will complete his therapies at TCU and then will f/u with [facility] for intrathecal catheter dye study. (Had attempt to schedule it for during his TCU stay but cost is too high so will have to have it done after TCU stay).     Today Jasmeet reports he is doing quite well. His bowel movements have become more regular as he improves from the Norovirus. He still has occasional nausea. No pain at the penis where the catheter was. Voiding without issue. Pain is becoming more well controlled. Using less PRN medications. Sleeping at baseline. He is using 0-1 oxycodone per day so we discussed decreasing the order to reflect his needs.  Appetite stable. Mood stable. Denies shortness of breath, chest pain, palpitations, dizziness, lightheadedness.     CODE STATUS/ADVANCE DIRECTIVES DISCUSSION:  Full Code  CPR/Full code   ALLERGIES:   Allergies   Allergen Reactions    Adhesive Tape      Current Outpatient Medications   Medication Sig Dispense Refill     acetaminophen (TYLENOL) 325 MG tablet Take 650 mg by mouth every 4 hours as needed for mild pain.      aspirin (ASA) 81 MG chewable tablet Take 81 mg by mouth daily.      atorvastatin (LIPITOR) 80 MG tablet Take 80 mg by mouth daily.      baclofen (LIORESAL) 10 MG tablet Take 10 mg by mouth 3 times daily as needed for muscle spasms.      baclofen (LIORESAL) 10 MG tablet Take 10 mg by mouth 2 times daily.      baclofen (LIORESAL) 20 MG tablet Take 20 mg by mouth daily.      BUPROPION HCL ER, XL, PO Take 300 mg by mouth daily      diazepam (VALIUM) 5 MG tablet Take 1 tablet (5 mg) by mouth daily as needed for anxiety. 15 tablet 0    dicyclomine (BENTYL) 20 MG tablet Take 20 mg by mouth 3 times daily as needed.      DULoxetine HCl (CYMBALTA PO) Take 60 mg by mouth daily 2 tabs in AM      folic acid (FOLVITE) 400 MCG tablet Take 400 mcg by mouth daily.      gabapentin (NEURONTIN) 600 MG tablet Take 600 mg by mouth 2 times daily.      glipiZIDE (GLUCOTROL) 10 MG tablet Take 10 mg by mouth daily.      ketoconazole (NIZORAL) 2 % external cream Apply topically 2 times daily.      lisinopril (ZESTRIL) 40 MG tablet Take 40 mg by mouth daily.      melatonin 3 MG tablet Take 3 mg by mouth nightly as needed for sleep.      metFORMIN (GLUCOPHAGE) 1000 MG tablet Take 1,000 mg by mouth 2 times daily (with meals).      metoprolol succinate ER (TOPROL XL) 50 MG 24 hr tablet Take 50 mg by mouth daily.      naloxone (NARCAN) 1 mg/mL SUSP Take 1 mg by mouth every 5 minutes as needed for constipation.      oxyCODONE (ROXICODONE) 5 MG tablet TAKE 1 TABLET BY MOUTH TWICE DAILY AS NEEDED 60 tablet 0    pantoprazole sodium (PROTONIX) 40 MG packet Take 40 mg by mouth daily.      polyethylene glycol (MIRALAX) 17 g packet Take 17 g by mouth daily.      Probiotic Product (EULOGIO-BID PROBIOTIC PO) Take 250 mg by mouth daily.      prochlorperazine (COMPAZINE) 10 MG tablet Take 10 mg by mouth every 6 hours as needed for nausea or vomiting.       "senna-docusate (SENOKOT-S/PERICOLACE) 8.6-50 MG tablet Take 1 tablet by mouth daily.      tamsulosin (FLOMAX) 0.4 MG capsule Take 0.4 mg by mouth daily.       No current facility-administered medications for this visit.      ROS:  Other than stated in HPI all other review of systems is negative.      Vital signs:/77   Pulse 59   Temp 97  F (36.1  C)   Resp 16   Ht 1.803 m (5' 11\")   Wt 123.2 kg (271 lb 8 oz)   SpO2 95%   BMI 37.87 kg/m     GENERAL APPEARANCE: Well developed, well nourished, in no acute distress.  LUNGS: Lung sounds CTA, no adventitious sounds, respiratory effort normal.  CARD: RRR, S1, S2, without murmurs, gallops, rubs, no JVD, peripheral pulses 2+ and symmetric  ABD: Soft, nondistended and nontender with normal bowel sounds.   MSK: Muscle strength and tone were equal bilaterally. Moves all extremities easily and intentionally.   EXTREMITIES: No cyanosis, clubbing or edema.  NEURO: Alert and oriented x 3. Normal affect. Sensation to touch was normal. Face is symmetric.  PSYCH: pleasant    Lab/Diagnostic data:  Labs reviewed in EPIC by me including magnesium, CBC and BMP       ASSESSMENT/PLAN:  Intrathecal pain pump  Muscle spasms  Chronic low back pain  Anxiety  Fibromyalgia syndrome  Depression  Obesity  BMI 35-39.9  Initially was hypotensive upon admission to ED, thought to be related to pain meds  Negative EEG, xr catheter study negative  [facility] to follow up with patient upon TCU discharge for evaluation and dye study  Spasms currently managed with PRN tylenol, PRN baclofen, scheduled baclofen, PRN oxycodone (decrease frequency to q12h)   Discontinue PRN tizanidine  Anxiety thought to be contributing to spasms- continue diazepam PRN, duloxetine, and bupropion  PT/OT  Weight 271.5 lbs     SVT  HTN  Coronary artery atherosclerosis  Metoprolol start in the hosptial (PTA verapamil Dc'd)  Hrs 60s-80s  Bps 130/70s  Continue PTA lisinopril and asa 81   Continue " atorvastatin  Currently completing zio patch- follow up with cardiology up discharge from TCU    Type II DM  Most recent A1c 8.3%  Continue PTA glipizide  -150s  No changes today    Orders:  Decrease frequency of oxycodone  Discontinue tizanidine    Electronically signed by:  JONAS Walls CNP on 4/7/2025 at 4:13 PM

## 2025-04-07 NOTE — LETTER
4/7/2025      Royer Landon  [address]  [place] [address]        [facility] GERIATRICS    PRIMARY CARE PROVIDER AND CLINIC:  Jose J Angeles MD, [address] [facility] [address] / [place] [address]  Chief Complaint   Patient presents with     [place]      [place] Medical Record Number:  [id number]  Place of Service where encounter took place:  [facility] ([facility]) [[id number]]    Royer Landon  is a 65 year old  (1960), admitted to the above facility from  [facility]. Hospital stay 3/26 through 4/1..     HPI:  Brief Hospital Course Summary:  Presented to the ED with two day sof worsening muscle spasms. Has a history of baclofen/morphine pump catheter occulusion requriing replacement.   On arrival to the ED patient was hypertensive (180/116 mmHg) and tachycardic (126). While in the ER he became hypotensive and no longer fluid responsive.  He met septic shock criteria (BP 86/45 mmHg, Creatinine 2.23, WBC 19.7, lactate 3.1), started on Levophed, given IV antibiotics and admitted for further management.    The patient was admitted to the ICU, monitored on telemetry.  BP dropped after receiving IV Valium 5 mg.  Hypotension resolved.  Infectious work up negative.  ID consulted. WBC count improved from 19.7 to 8.6.  Blood and Urine Cultures negative. COVID-19/RSV/Flu negative.  IV Zosyn stopped (3/26- 3/29).   Septic Shock ruled out; rather hypotensive episode is from pain med.   Pain clinic and PMR consulted.  Pain and muscle spasms not well controlled with current baclofen/Morphine intrathecal pump.  Concern for pump malfunction vs kink.  XR Baclofen Pump and Catheter check custom negative for kinks.  XR Lumbar and Thoracic Spine completed.  Appears Cathter tip is at T6.  Per [employer] Pump Interrogation- intrathecal cathter tip should terminate at T8.   Not sure if T6 is previous cathter tip or current pump/cathter tip migration.   Patient is concerned his cathter tip  is occluded.  He states he has had dual Baclofen/Morphine pump for decades.  Most recently replaced by Dr. Augustine on 7/13/2021.  He gets his pump/cathter exchanged every 5 years.  Initial Baclofen/Morphine pump placed by Dr. Ranjith Wharton in ~ 2007?  Etiology of muscle spasms likely from uncontrolled pain.  [facility] was consulted to evaluate for seizures.  Patient had a video EEG that was negative for seizures.  [place] [facility] signed off.  Neuro-hospitalist consulted 3/28 and signed off 3/29.  Unlikely patient has MS or some other neurological disorder contributing to worsening muscle spasms.  Neurology recommends he gets his Baclofen/Morphine pump evaluated.  Psychiatry was also consulted to weigh in on patient's worsening muscle spasms and if malingering or uncontrolled anxiety is playing a role.  Dr. Mosquera evaluate patient and felt patient's acute on on chronic pain, muscle spasms are causing worsening anxiety and recommend continuing with Buproprion Xl 300 mg AM and Duloxetine 60 mg AM unchanged.   [facility] and PMR evaluated patient.  Patient does not have signs of opioid withdrawal.  Considering patient's intrathecal pump delivers both Morphine and Baclofen, patient should have signs of opioid withdrawal if his pump was malfunctioning.    [employer] interrogated pump.  Both Medications (Morphine and Baclofen) are being delivered appropriately.  Unsure if there is partial occlusion in cathter tip.  [facility] does not have Privileges at [facility]; but do have privileges at [facility].  Discussed case with Dr. Augustine ([facility] on 3/28, Gege NP on 3/27,  Jesus GONZALEZ from [facility] on 3/27).  Per [facility] records there are no recent changes in medication dose.  Recent February 2025 drain study shows pump in appropriate location.    There was also concern of MS in [facility] note, but patient denies being diagnosed with MS.  Neurology- Dr. Aguirre does not feel patient has MS.   His home oral baclofen was  increased to Baclofen 10 mg BID/20mg hs, and 10 mg three times daily PRN. This was helpful. Robaxin prn added for muscle spasm as well.  Oxycodone prn helpful for pain control. Will allow for daily prn valium for anxiety/muscle spasm though should minimize use.  While on telemetry, patient's heart rate elevated up to 150s- 180s  Cardiology consult requested.  Monitor on telemetry. Cardiology notes that pt had an episode of SVT in the setting of holding his verapamil. Discussion was held about restarting verapamil but pt/wife note that verapamil is very expensive so decision made to switch to metoprolol. Now is on metoprolol 25mg bid. Plan for Zio patch for 2 weeks at discharge. Follow up with cardiology in 4 weeks to review Zio patch.  Urology consulted for urinary retention, placed a mac.  Suspect patient has BPH and some element of neurogenic bladder given multiple spine surgeries.  Initial spine surgery was in 1982. Mac catheter placed 3/28; keep fo 5-7 days and then consider TOV.  Pt much weaker compared to prior to admission. TCU recommended and found. He will complete his therapies at TCU and then will f/u with [facility] for intrathecal catheter dye study. (Had attempt to schedule it for during his TCU stay but cost is too high so will have to have it done after TCU stay).     Today Jasmeet reports he is doing quite well. His bowel movements have become more regular as he improves from the Norovirus. He still has occasional nausea. No pain at the penis where the catheter was. Voiding without issue. Pain is becoming more well controlled. Using less PRN medications. Sleeping at baseline. He is using 0-1 oxycodone per day so we discussed decreasing the order to reflect his needs.  Appetite stable. Mood stable. Denies shortness of breath, chest pain, palpitations, dizziness, lightheadedness.     CODE STATUS/ADVANCE DIRECTIVES DISCUSSION:  Full Code  CPR/Full code   ALLERGIES:   Allergies   Allergen Reactions      Adhesive Tape      Current Outpatient Medications   Medication Sig Dispense Refill     acetaminophen (TYLENOL) 325 MG tablet Take 650 mg by mouth every 4 hours as needed for mild pain.       aspirin (ASA) 81 MG chewable tablet Take 81 mg by mouth daily.       atorvastatin (LIPITOR) 80 MG tablet Take 80 mg by mouth daily.       baclofen (LIORESAL) 10 MG tablet Take 10 mg by mouth 3 times daily as needed for muscle spasms.       baclofen (LIORESAL) 10 MG tablet Take 10 mg by mouth 2 times daily.       baclofen (LIORESAL) 20 MG tablet Take 20 mg by mouth daily.       BUPROPION HCL ER, XL, PO Take 300 mg by mouth daily       diazepam (VALIUM) 5 MG tablet Take 1 tablet (5 mg) by mouth daily as needed for anxiety. 15 tablet 0     dicyclomine (BENTYL) 20 MG tablet Take 20 mg by mouth 3 times daily as needed.       DULoxetine HCl (CYMBALTA PO) Take 60 mg by mouth daily 2 tabs in AM       folic acid (FOLVITE) 400 MCG tablet Take 400 mcg by mouth daily.       gabapentin (NEURONTIN) 600 MG tablet Take 600 mg by mouth 2 times daily.       glipiZIDE (GLUCOTROL) 10 MG tablet Take 10 mg by mouth daily.       ketoconazole (NIZORAL) 2 % external cream Apply topically 2 times daily.       lisinopril (ZESTRIL) 40 MG tablet Take 40 mg by mouth daily.       melatonin 3 MG tablet Take 3 mg by mouth nightly as needed for sleep.       metFORMIN (GLUCOPHAGE) 1000 MG tablet Take 1,000 mg by mouth 2 times daily (with meals).       metoprolol succinate ER (TOPROL XL) 50 MG 24 hr tablet Take 50 mg by mouth daily.       naloxone (NARCAN) 1 mg/mL SUSP Take 1 mg by mouth every 5 minutes as needed for constipation.       oxyCODONE (ROXICODONE) 5 MG tablet TAKE 1 TABLET BY MOUTH TWICE DAILY AS NEEDED 60 tablet 0     pantoprazole sodium (PROTONIX) 40 MG packet Take 40 mg by mouth daily.       polyethylene glycol (MIRALAX) 17 g packet Take 17 g by mouth daily.       Probiotic Product (EULOGIO-BID PROBIOTIC PO) Take 250 mg by mouth daily.        "prochlorperazine (COMPAZINE) 10 MG tablet Take 10 mg by mouth every 6 hours as needed for nausea or vomiting.       senna-docusate (SENOKOT-S/PERICOLACE) 8.6-50 MG tablet Take 1 tablet by mouth daily.       tamsulosin (FLOMAX) 0.4 MG capsule Take 0.4 mg by mouth daily.       No current facility-administered medications for this visit.      ROS:  Other than stated in HPI all other review of systems is negative.      Vital signs:/77   Pulse 59   Temp 97  F (36.1  C)   Resp 16   Ht 1.803 m (5' 11\")   Wt 123.2 kg (271 lb 8 oz)   SpO2 95%   BMI 37.87 kg/m     GENERAL APPEARANCE: Well developed, well nourished, in no acute distress.  LUNGS: Lung sounds CTA, no adventitious sounds, respiratory effort normal.  CARD: RRR, S1, S2, without murmurs, gallops, rubs, no JVD, peripheral pulses 2+ and symmetric  ABD: Soft, nondistended and nontender with normal bowel sounds.   MSK: Muscle strength and tone were equal bilaterally. Moves all extremities easily and intentionally.   EXTREMITIES: No cyanosis, clubbing or edema.  NEURO: Alert and oriented x 3. Normal affect. Sensation to touch was normal. Face is symmetric.  PSYCH: pleasant    Lab/Diagnostic data:  Labs reviewed in EPIC by me including magnesium, CBC and BMP       ASSESSMENT/PLAN:  Intrathecal pain pump  Muscle spasms  Chronic low back pain  Anxiety  Fibromyalgia syndrome  Depression  Obesity  BMI 35-39.9  Initially was hypotensive upon admission to ED, thought to be related to pain meds  Negative EEG, xr catheter study negative  [facility] to follow up with patient upon TCU discharge for evaluation and dye study  Spasms currently managed with PRN tylenol, PRN baclofen, scheduled baclofen, PRN oxycodone (decrease frequency to q12h)   Discontinue PRN tizanidine  Anxiety thought to be contributing to spasms- continue diazepam PRN, duloxetine, and bupropion  PT/OT  Weight 271.5 lbs     SVT  HTN  Coronary artery atherosclerosis  Metoprolol start in " the hosptial (PTA verapamil Dc'd)  Hrs 60s-80s  Bps 130/70s  Continue PTA lisinopril and asa 81   Continue atorvastatin  Currently completing zio patch- follow up with cardiology up discharge from TCU    Type II DM  Most recent A1c 8.3%  Continue PTA glipizide  -150s  No changes today    Orders:  Decrease frequency of oxycodone  Discontinue tizanidine    Electronically signed by:  JONAS Walls CNP on 4/7/2025 at 4:13 PM                          Sincerely,        JONAS Walls CNP    Electronically signed

## 2025-04-14 ENCOUNTER — TRANSITIONAL CARE UNIT VISIT (OUTPATIENT)
Dept: GERIATRICS | Facility: CLINIC | Age: 65
End: 2025-04-14
Payer: COMMERCIAL

## 2025-04-14 ENCOUNTER — TELEPHONE (OUTPATIENT)
Dept: GERIATRICS | Facility: CLINIC | Age: 65
End: 2025-04-14

## 2025-04-14 VITALS
HEART RATE: 117 BPM | OXYGEN SATURATION: 96 % | DIASTOLIC BLOOD PRESSURE: 71 MMHG | RESPIRATION RATE: 17 BRPM | TEMPERATURE: 96.8 F | WEIGHT: 269.4 LBS | BODY MASS INDEX: 37.57 KG/M2 | SYSTOLIC BLOOD PRESSURE: 107 MMHG

## 2025-04-14 DIAGNOSIS — Z95.828 PRESENCE OF IMPLANTED INFUSION PUMP: Primary | ICD-10-CM

## 2025-04-14 DIAGNOSIS — M79.7 FIBROMYALGIA: ICD-10-CM

## 2025-04-14 DIAGNOSIS — F33.9 RECURRENT MAJOR DEPRESSIVE DISORDER, REMISSION STATUS UNSPECIFIED: ICD-10-CM

## 2025-04-14 DIAGNOSIS — Z71.89 GOALS OF CARE, COUNSELING/DISCUSSION: ICD-10-CM

## 2025-04-14 DIAGNOSIS — G89.29 CHRONIC LOW BACK PAIN, UNSPECIFIED BACK PAIN LATERALITY, UNSPECIFIED WHETHER SCIATICA PRESENT: ICD-10-CM

## 2025-04-14 DIAGNOSIS — M62.838 MUSCLE SPASM: ICD-10-CM

## 2025-04-14 DIAGNOSIS — I10 PRIMARY HYPERTENSION: ICD-10-CM

## 2025-04-14 DIAGNOSIS — I47.10 SVT (SUPRAVENTRICULAR TACHYCARDIA): ICD-10-CM

## 2025-04-14 DIAGNOSIS — F51.01 PRIMARY INSOMNIA: ICD-10-CM

## 2025-04-14 DIAGNOSIS — I25.10 ATHEROSCLEROSIS OF NATIVE CORONARY ARTERY OF NATIVE HEART WITHOUT ANGINA PECTORIS: ICD-10-CM

## 2025-04-14 DIAGNOSIS — E11.9 TYPE 2 DIABETES MELLITUS WITHOUT COMPLICATION, WITHOUT LONG-TERM CURRENT USE OF INSULIN (H): ICD-10-CM

## 2025-04-14 DIAGNOSIS — F41.9 ANXIETY: ICD-10-CM

## 2025-04-14 DIAGNOSIS — M54.50 CHRONIC LOW BACK PAIN, UNSPECIFIED BACK PAIN LATERALITY, UNSPECIFIED WHETHER SCIATICA PRESENT: ICD-10-CM

## 2025-04-14 DIAGNOSIS — E66.812 OBESITY, CLASS II, BMI 35-39.9: ICD-10-CM

## 2025-04-14 PROCEDURE — 99310 SBSQ NF CARE HIGH MDM 45: CPT

## 2025-04-14 NOTE — PROGRESS NOTES
[facility] GERIATRICS    PRIMARY CARE PROVIDER AND CLINIC:  Jose J Angeles MD, [address] / [place] [address]  Chief Complaint   Patient presents with    [facility] Medical Record Number:  [id number]  Place of Service where encounter took place:  [facility] ([facility]) [[id number]]    Royer Landon  is a 65 year old  (1960), admitted to the above facility from  [facility]. Hospital stay 3/26 through 4/1..     HPI:  Brief Hospital Course Summary:  Presented to the ED with two day sof worsening muscle spasms. Has a history of baclofen/morphine pump catheter occulusion requriing replacement.   On arrival to the ED patient was hypertensive (180/116 mmHg) and tachycardic (126). While in the ER he became hypotensive and no longer fluid responsive.  He met septic shock criteria (BP 86/45 mmHg, Creatinine 2.23, WBC 19.7, lactate 3.1), started on Levophed, given IV antibiotics and admitted for further management.    The patient was admitted to the ICU, monitored on telemetry.  BP dropped after receiving IV Valium 5 mg.  Hypotension resolved.  Infectious work up negative.  ID consulted. WBC count improved from 19.7 to 8.6.  Blood and Urine Cultures negative. COVID-19/RSV/Flu negative.  IV Zosyn stopped (3/26- 3/29).   Septic Shock ruled out; rather hypotensive episode is from pain med.   Pain clinic and PMR consulted.  Pain and muscle spasms not well controlled with current baclofen/Morphine intrathecal pump.  Concern for pump malfunction vs kink.  XR Baclofen Pump and Catheter check custom negative for kinks.  XR Lumbar and Thoracic Spine completed.  Appears Cathter tip is at T6.  Per [employer] Pump Interrogation- intrathecal cathter tip should terminate at T8.   Not sure if T6 is previous cathter tip or current pump/cathter tip migration.   Patient is concerned his cathter tip is occluded.  He states he has had dual Baclofen/Morphine pump for decades.  Most  recently replaced by Dr. Augustine on 7/13/2021.  He gets his pump/cathter exchanged every 5 years.  Initial Baclofen/Morphine pump placed by Dr. Ranjith Wharton in ~ 2007?  Etiology of muscle spasms likely from uncontrolled pain.  KUNAL was consulted to evaluate for seizures.  Patient had a video EEG that was negative for seizures.  [place] [facility] signed off.  Neuro-hospitalist consulted 3/28 and signed off 3/29.  Unlikely patient has MS or some other neurological disorder contributing to worsening muscle spasms.  Neurology recommends he gets his Baclofen/Morphine pump evaluated.  Psychiatry was also consulted to weigh in on patient's worsening muscle spasms and if malingering or uncontrolled anxiety is playing a role.  Dr. Mosquera evaluate patient and felt patient's acute on on chronic pain, muscle spasms are causing worsening anxiety and recommend continuing with Buproprion Xl 300 mg AM and Duloxetine 60 mg AM unchanged.   [facility] and PMR evaluated patient.  Patient does not have signs of opioid withdrawal.  Considering patient's intrathecal pump delivers both Morphine and Baclofen, patient should have signs of opioid withdrawal if his pump was malfunctioning.    [employer] interrogated pump.  Both Medications (Morphine and Baclofen) are being delivered appropriately.  Unsure if there is partial occlusion in cathter tip.  [facility] does not have Privileges at [facility]; but do have privileges at [facility].  Discussed case with Dr. Augustine ([facility] on 3/28, Gege NP on 3/27,  Jesus GONZALEZ from [facility] on 3/27).  Per [facility] records there are no recent changes in medication dose.  Recent February 2025 drain study shows pump in appropriate location.    There was also concern of MS in [facility] note, but patient denies being diagnosed with MS.  Neurology- Dr. Aguirre does not feel patient has MS.   His home oral baclofen was increased to Baclofen 10 mg BID/20mg hs, and 10 mg three times daily PRN. This was  helpful. Robaxin prn added for muscle spasm as well.  Oxycodone prn helpful for pain control. Will allow for daily prn valium for anxiety/muscle spasm though should minimize use.  While on telemetry, patient's heart rate elevated up to 150s- 180s  Cardiology consult requested.  Monitor on telemetry. Cardiology notes that pt had an episode of SVT in the setting of holding his verapamil. Discussion was held about restarting verapamil but pt/wife note that verapamil is very expensive so decision made to switch to metoprolol. Now is on metoprolol 25mg bid. Plan for Zio patch for 2 weeks at discharge. Follow up with cardiology in 4 weeks to review Zio patch.  Urology consulted for urinary retention, placed a mac.  Suspect patient has BPH and some element of neurogenic bladder given multiple spine surgeries.  Initial spine surgery was in 1982. Mac catheter placed 3/28; keep fo 5-7 days and then consider TOV.  Pt much weaker compared to prior to admission. TCU recommended and found. He will complete his therapies at TCU and then will f/u with [facility] for intrathecal catheter dye study. (Had attempt to schedule it for during his TCU stay but cost is too high so will have to have it done after TCU stay).     Today Jasmeet is having spasms when I arrive for his visit. His wife is at his bedside. He reports that his pain/spasms start in his shoulders and travel down his body. He has little control over his body and his arms and legs flail. With use of oxycodone and diazepam, his symptoms resolve after about 30 minutes and he is able to relax again. He reports significant chest tightness likely related to anxiety during the episodes. Lengthy discussion with his wife answer questions around why Jasmeet has these episodes. We reviewed testing that was completed in the hospital. Discussed that the ultimate goal is to to keep Jasmeet in the TCU so he can get out to his pain clinic appointment. Sleeping okay. Appetite stable. Mood stable.  Having regular bowel movements. Denies urinary symptoms including dysuria or hematuria. Denies shortness of breath, palpitations, dizziness, lightheadedness.    4/14/2025 1245: Triage call with concerns from nursing as Jasmeet is having another episode of spasms that are not improving with PRN medications. His HR is 117. He wants to go to the hospital. Continue to monitor and if symptoms do not improve in 30 mins, okay to send to ED.     CODE STATUS/ADVANCE DIRECTIVES DISCUSSION:  Full Code  CPR/Full code   ALLERGIES:   Allergies   Allergen Reactions    Adhesive Tape      Current Outpatient Medications   Medication Sig Dispense Refill    acetaminophen (TYLENOL) 325 MG tablet Take 650 mg by mouth 4 times daily.      aspirin (ASA) 81 MG chewable tablet Take 81 mg by mouth daily.      atorvastatin (LIPITOR) 80 MG tablet Take 80 mg by mouth daily.      baclofen (LIORESAL) 10 MG tablet Take 10 mg by mouth 3 times daily as needed for muscle spasms.      baclofen (LIORESAL) 10 MG tablet Take 10 mg by mouth 2 times daily.      baclofen (LIORESAL) 20 MG tablet Take 20 mg by mouth daily.      BUPROPION HCL ER, XL, PO Take 300 mg by mouth daily      diazepam (VALIUM) 5 MG tablet Take 1 tablet (5 mg) by mouth daily as needed for anxiety. 15 tablet 0    dicyclomine (BENTYL) 20 MG tablet Take 20 mg by mouth 3 times daily as needed.      DULoxetine HCl (CYMBALTA PO) Take 60 mg by mouth daily 2 tabs in AM      folic acid (FOLVITE) 400 MCG tablet Take 400 mcg by mouth daily.      gabapentin (NEURONTIN) 600 MG tablet Take 600 mg by mouth 2 times daily.      glipiZIDE (GLUCOTROL) 10 MG tablet Take 10 mg by mouth daily.      ketoconazole (NIZORAL) 2 % external cream Apply topically 2 times daily.      lisinopril (ZESTRIL) 40 MG tablet Take 40 mg by mouth daily.      melatonin 3 MG tablet Take 6 mg by mouth nightly as needed for sleep.      metFORMIN (GLUCOPHAGE) 1000 MG tablet Take 1,000 mg by mouth 2 times daily (with meals).       metoprolol succinate ER (TOPROL XL) 50 MG 24 hr tablet Take 50 mg by mouth daily.      naloxone (NARCAN) 1 mg/mL SUSP Take 1 mg by mouth every 5 minutes as needed for constipation.      oxyCODONE (ROXICODONE) 5 MG tablet TAKE 1 TABLET BY MOUTH TWICE DAILY AS NEEDED 60 tablet 0    pantoprazole sodium (PROTONIX) 40 MG packet Take 40 mg by mouth daily.      polyethylene glycol (MIRALAX) 17 g packet Take 17 g by mouth daily.      Probiotic Product (EULOGIO-BID PROBIOTIC PO) Take 250 mg by mouth daily.      prochlorperazine (COMPAZINE) 10 MG tablet Take 10 mg by mouth every 6 hours as needed for nausea or vomiting.      senna-docusate (SENOKOT-S/PERICOLACE) 8.6-50 MG tablet Take 1 tablet by mouth daily.      tamsulosin (FLOMAX) 0.4 MG capsule Take 0.4 mg by mouth daily.       No current facility-administered medications for this visit.      ROS:  Other than stated in HPI all other review of systems is negative.      Vital signs:/71   Pulse 117   Temp 96.8  F (36  C)   Resp 17   Wt 122.2 kg (269 lb 6.4 oz)   SpO2 96%   BMI 37.57 kg/m     GENERAL APPEARANCE: In distress, hands and legs flailing with muscle spasms  LUNGS: Lung sounds CTA, no adventitious sounds, respiratory effort normal.  CARD: RRR, S1, S2, without murmurs, gallops, rubs, no JVD, peripheral pulses 2+ and symmetric  ABD: Soft, nondistended and nontender with normal bowel sounds.   MSK: Muscle strength and tone were equal bilaterally. Moves all extremities easily and intentionally.   EXTREMITIES: No cyanosis, clubbing or edema.  NEURO: Not opening eyes, hands clenched  PSYCH: anxious, irritable     Lab/Diagnostic data:  Labs reviewed in EPIC by me    ASSESSMENT/PLAN:  Intrathecal pain pump  Muscle spasms  Chronic low back pain  Anxiety  Fibromyalgia syndrome  Depression  Obesity  BMI 35-39.9  Goals of care counseling  Initially was hypotensive upon admission to ED, thought to be related to pain meds  Negative EEG, xr catheter study negative  [facility] to follow up with patient upon TCU discharge for evaluation and dye study  Spasms currently managed with PRN tylenol, PRN baclofen, scheduled baclofen, PRN oxycodone 5 mg q12 hrs  Anxiety thought to be contributing to spasms- continue diazepam PRN (using once every couple of days), duloxetine, and bupropion  PT/OT  Weight 269 lbs   Lengthy discussion with Jasmeet's wife regarding etiology of spasms, review of previous workup. She is concerned about bringing Jasmeet home in his current condition given that he will be unable to care for himself.     SVT  HTN  Coronary artery atherosclerosis  Metoprolol start in the hosptial (PTA verapamil Dc'd)  Hrs 60s-70s  Bps 100s-130s/70s-80s  Continue PTA lisinopril and asa 81   Continue atorvastatin  Currently completing zio patch- follow up with cardiology up discharge from TCU    Type II DM  Well controlled blood sugars  Most recent A1c 8.3%  Continue PTA glipizide  -130    Insomnia  Chronic, stable  Encourage sleep hygiene and daytime activity  Increase melatonin to 6 mg at HS  Encourage Jasmeet to use oxycodone at bedtime to reduce wake ups related to pain  Request staff cluster cares and avoid waking pt at night time    Orders:  NNO    >45 minutes spent assessing patient, providing education to patient and family, reviewing records from historical visits with family, collaborating with nursing, developing plan of care.     Electronically signed by:  JONAS Walls CNP on 4/14/2025 at 3:31 PM

## 2025-04-14 NOTE — LETTER
4/14/2025      Royer Landon  [address]  [place] [address]        [facility] GERIATRICS    PRIMARY CARE PROVIDER AND CLINIC:  Jose J Angeles MD, [address] / [place] [address]  Chief Complaint   Patient presents with     [place]      [place] Medical Record Number:  [id number]  Place of Service where encounter took place:  [facility] ([facility]) [[id number]]    Royer Landon  is a 65 year old  (1960), admitted to the above facility from  [facility]. Hospital stay 3/26 through 4/1..     HPI:  Brief Hospital Course Summary:  Presented to the ED with two day sof worsening muscle spasms. Has a history of baclofen/morphine pump catheter occulusion requriing replacement.   On arrival to the ED patient was hypertensive (180/116 mmHg) and tachycardic (126). While in the ER he became hypotensive and no longer fluid responsive.  He met septic shock criteria (BP 86/45 mmHg, Creatinine 2.23, WBC 19.7, lactate 3.1), started on Levophed, given IV antibiotics and admitted for further management.    The patient was admitted to the ICU, monitored on telemetry.  BP dropped after receiving IV Valium 5 mg.  Hypotension resolved.  Infectious work up negative.  ID consulted. WBC count improved from 19.7 to 8.6.  Blood and Urine Cultures negative. COVID-19/RSV/Flu negative.  IV Zosyn stopped (3/26- 3/29).   Septic Shock ruled out; rather hypotensive episode is from pain med.   Pain clinic and PMR consulted.  Pain and muscle spasms not well controlled with current baclofen/Morphine intrathecal pump.  Concern for pump malfunction vs kink.  XR Baclofen Pump and Catheter check custom negative for kinks.  XR Lumbar and Thoracic Spine completed.  Appears Cathter tip is at T6.  Per [employer] Pump Interrogation- intrathecal cathter tip should terminate at T8.   Not sure if T6 is previous cathter tip or current pump/cathter tip migration.   Patient is concerned his cathter tip  is occluded.  He states he has had dual Baclofen/Morphine pump for decades.  Most recently replaced by Dr. Augustine on 7/13/2021.  He gets his pump/cathter exchanged every 5 years.  Initial Baclofen/Morphine pump placed by Dr. Ranjith Wharton in ~ 2007?  Etiology of muscle spasms likely from uncontrolled pain.  [facility]NAL was consulted to evaluate for seizures.  Patient had a video EEG that was negative for seizures.  [place] [facility] signed off.  Neuro-hospitalist consulted 3/28 and signed off 3/29.  Unlikely patient has MS or some other neurological disorder contributing to worsening muscle spasms.  Neurology recommends he gets his Baclofen/Morphine pump evaluated.  Psychiatry was also consulted to weigh in on patient's worsening muscle spasms and if malingering or uncontrolled anxiety is playing a role.  Dr. Mosquera evaluate patient and felt patient's acute on on chronic pain, muscle spasms are causing worsening anxiety and recommend continuing with Buproprion Xl 300 mg AM and Duloxetine 60 mg AM unchanged.   [facility] and PMR evaluated patient.  Patient does not have signs of opioid withdrawal.  Considering patient's intrathecal pump delivers both Morphine and Baclofen, patient should have signs of opioid withdrawal if his pump was malfunctioning.    [employer] interrogated pump.  Both Medications (Morphine and Baclofen) are being delivered appropriately.  Unsure if there is partial occlusion in cathter tip.  [facility] does not have Privileges at [facility]; but do have privileges at [facility].  Discussed case with Dr. Augustine ([facility] on 3/28, [facility] NP on 3/27,  Jesus GONZALEZ from [facility] on 3/27).  Per [facility] records there are no recent changes in medication dose.  Recent February 2025 drain study shows pump in appropriate location.    There was also concern of MS in [facility] note, but patient denies being diagnosed with MS.  Neurology- Dr. Aguirre does not feel patient has MS.   His home oral baclofen was  increased to Baclofen 10 mg BID/20mg hs, and 10 mg three times daily PRN. This was helpful. Robaxin prn added for muscle spasm as well.  Oxycodone prn helpful for pain control. Will allow for daily prn valium for anxiety/muscle spasm though should minimize use.  While on telemetry, patient's heart rate elevated up to 150s- 180s  Cardiology consult requested.  Monitor on telemetry. Cardiology notes that pt had an episode of SVT in the setting of holding his verapamil. Discussion was held about restarting verapamil but pt/wife note that verapamil is very expensive so decision made to switch to metoprolol. Now is on metoprolol 25mg bid. Plan for Zio patch for 2 weeks at discharge. Follow up with cardiology in 4 weeks to review Zio patch.  Urology consulted for urinary retention, placed a mac.  Suspect patient has BPH and some element of neurogenic bladder given multiple spine surgeries.  Initial spine surgery was in 1982. Mac catheter placed 3/28; keep fo 5-7 days and then consider TOV.  Pt much weaker compared to prior to admission. TCU recommended and found. He will complete his therapies at TCU and then will f/u with [facility] for intrathecal catheter dye study. (Had attempt to schedule it for during his TCU stay but cost is too high so will have to have it done after TCU stay).     Today Jasmeet is having spasms when I arrive for his visit. His wife is at his bedside. He reports that his pain/spasms start in his shoulders and travel down his body. He has little control over his body and his arms and legs flail. With use of oxycodone and diazepam, his symptoms resolve after about 30 minutes and he is able to relax again. He reports significant chest tightness likely related to anxiety during the episodes. Lengthy discussion with his wife answer questions around why Jasmeet has these episodes. We reviewed testing that was completed in the hospital. Discussed that the ultimate goal is to to keep Jasmeet in the TCU so he can get  out to his pain clinic appointment. Sleeping okay. Appetite stable. Mood stable. Having regular bowel movements. Denies urinary symptoms including dysuria or hematuria. Denies shortness of breath, palpitations, dizziness, lightheadedness.    4/14/2025 1245: Triage call with concerns from nursing as Jasmeet is having another episode of spasms that are not improving with PRN medications. His HR is 117. He wants to go to the hospital. Continue to monitor and if symptoms do not improve in 30 mins, okay to send to ED.     CODE STATUS/ADVANCE DIRECTIVES DISCUSSION:  Full Code  CPR/Full code   ALLERGIES:   Allergies   Allergen Reactions     Adhesive Tape      Current Outpatient Medications   Medication Sig Dispense Refill     acetaminophen (TYLENOL) 325 MG tablet Take 650 mg by mouth 4 times daily.       aspirin (ASA) 81 MG chewable tablet Take 81 mg by mouth daily.       atorvastatin (LIPITOR) 80 MG tablet Take 80 mg by mouth daily.       baclofen (LIORESAL) 10 MG tablet Take 10 mg by mouth 3 times daily as needed for muscle spasms.       baclofen (LIORESAL) 10 MG tablet Take 10 mg by mouth 2 times daily.       baclofen (LIORESAL) 20 MG tablet Take 20 mg by mouth daily.       BUPROPION HCL ER, XL, PO Take 300 mg by mouth daily       diazepam (VALIUM) 5 MG tablet Take 1 tablet (5 mg) by mouth daily as needed for anxiety. 15 tablet 0     dicyclomine (BENTYL) 20 MG tablet Take 20 mg by mouth 3 times daily as needed.       DULoxetine HCl (CYMBALTA PO) Take 60 mg by mouth daily 2 tabs in AM       folic acid (FOLVITE) 400 MCG tablet Take 400 mcg by mouth daily.       gabapentin (NEURONTIN) 600 MG tablet Take 600 mg by mouth 2 times daily.       glipiZIDE (GLUCOTROL) 10 MG tablet Take 10 mg by mouth daily.       ketoconazole (NIZORAL) 2 % external cream Apply topically 2 times daily.       lisinopril (ZESTRIL) 40 MG tablet Take 40 mg by mouth daily.       melatonin 3 MG tablet Take 6 mg by mouth nightly as needed for sleep.        metFORMIN (GLUCOPHAGE) 1000 MG tablet Take 1,000 mg by mouth 2 times daily (with meals).       metoprolol succinate ER (TOPROL XL) 50 MG 24 hr tablet Take 50 mg by mouth daily.       naloxone (NARCAN) 1 mg/mL SUSP Take 1 mg by mouth every 5 minutes as needed for constipation.       oxyCODONE (ROXICODONE) 5 MG tablet TAKE 1 TABLET BY MOUTH TWICE DAILY AS NEEDED 60 tablet 0     pantoprazole sodium (PROTONIX) 40 MG packet Take 40 mg by mouth daily.       polyethylene glycol (MIRALAX) 17 g packet Take 17 g by mouth daily.       Probiotic Product (EULOGIO-BID PROBIOTIC PO) Take 250 mg by mouth daily.       prochlorperazine (COMPAZINE) 10 MG tablet Take 10 mg by mouth every 6 hours as needed for nausea or vomiting.       senna-docusate (SENOKOT-S/PERICOLACE) 8.6-50 MG tablet Take 1 tablet by mouth daily.       tamsulosin (FLOMAX) 0.4 MG capsule Take 0.4 mg by mouth daily.       No current facility-administered medications for this visit.      ROS:  Other than stated in HPI all other review of systems is negative.      Vital signs:/71   Pulse 117   Temp 96.8  F (36  C)   Resp 17   Wt 122.2 kg (269 lb 6.4 oz)   SpO2 96%   BMI 37.57 kg/m     GENERAL APPEARANCE: In distress, hands and legs flailing with muscle spasms  LUNGS: Lung sounds CTA, no adventitious sounds, respiratory effort normal.  CARD: RRR, S1, S2, without murmurs, gallops, rubs, no JVD, peripheral pulses 2+ and symmetric  ABD: Soft, nondistended and nontender with normal bowel sounds.   MSK: Muscle strength and tone were equal bilaterally. Moves all extremities easily and intentionally.   EXTREMITIES: No cyanosis, clubbing or edema.  NEURO: Not opening eyes, hands clenched  PSYCH: anxious, irritable     Lab/Diagnostic data:  Labs reviewed in EPIC by me    ASSESSMENT/PLAN:  Intrathecal pain pump  Muscle spasms  Chronic low back pain  Anxiety  Fibromyalgia syndrome  Depression  Obesity  BMI 35-39.9  Goals of care counseling  Initially was hypotensive upon  admission to ED, thought to be related to pain meds  Negative EEG, xr catheter study negative  [facility] to follow up with patient upon TCU discharge for evaluation and dye study  Spasms currently managed with PRN tylenol, PRN baclofen, scheduled baclofen, PRN oxycodone 5 mg q12 hrs  Anxiety thought to be contributing to spasms- continue diazepam PRN (using once every couple of days), duloxetine, and bupropion  PT/OT  Weight 269 lbs   Lengthy discussion with Jasmeet's wife regarding etiology of spasms, review of previous workup. She is concerned about bringing Jasmeet home in his current condition given that he will be unable to care for himself.     SVT  HTN  Coronary artery atherosclerosis  Metoprolol start in the hosptial (PTA verapamil Dc'd)  Hrs 60s-70s  Bps 100s-130s/70s-80s  Continue PTA lisinopril and asa 81   Continue atorvastatin  Currently completing zio patch- follow up with cardiology up discharge from TCU    Type II DM  Well controlled blood sugars  Most recent A1c 8.3%  Continue PTA glipizide  -130    Insomnia  Chronic, stable  Encourage sleep hygiene and daytime activity  Increase melatonin to 6 mg at HS  Encourage Jasmeet to use oxycodone at bedtime to reduce wake ups related to pain  Request staff cluster cares and avoid waking pt at night time    Orders:  NNO    >45 minutes spent assessing patient, providing education to patient and family, reviewing records from historical visits with family, collaborating with nursing, developing plan of care.     Electronically signed by:  JONAS Walls CNP on 4/14/2025 at 3:31 PM                            Sincerely,        JOANS Walls CNP    Electronically signed

## 2025-04-14 NOTE — TELEPHONE ENCOUNTER
[facility] Geriatrics Triage Call    Provider: JONAS Borden CNP  Facility: [facility]  Facility Type:  TCU    Caller: Nina   Call Back Number: [phone]      Allergies:    Allergies   Allergen Reactions    Adhesive Tape         SBAR:     S-(situation): The nurse is calling to report that the patient is having a muscle spasm of upper and lower extremities. Pt had an episode this am and was treated with prn diazepam, prn oxycodone and baclofen. The medications did help but now having the spasms again.    B-(background): hx of pain pump, muscle spasm   Diazepam 5mg every day prn     A-(assessment):   Prn dose of baclofen 10mg was given now and tylenol and is not helping.   Vitals: BP:  unable to respond   P:: machine 117-   R::   SPO2: 98% R/A    His upper and lower extremities are jerking d/t the muscle spasms. Pt is now diaphoretic.       Verbal Order per: JONAS Borden CNP  Continue to monitor and if no improvements in the next 30 min to send to ER     Verbal order/direction given to: LISA Cervantes, RN